# Patient Record
(demographics unavailable — no encounter records)

---

## 2024-10-30 NOTE — CONSULT LETTER
[Dear  ___] : Dear  [unfilled], [Courtesy Letter:] : I had the pleasure of seeing your patient, [unfilled], in my office today. [Sincerely,] : Sincerely, [FreeTextEntry2] : Ambika Basilio MD [address] [place] [id number] [FreeTextEntry1] : Mrs. Mclain is a very pleasant 39-year-old female patient who was seen in our office today at the request of the emergency department.  The patient suffered a traumatic head injury approximately 2 days ago after she was pulled down by her dog and struck her head on the driveway.  The patient presented to the emergency department whereupon she obtained a imaging of the head and neck which were negative for acute injuries.  The patient is currently asymptomatic.    The patient endorses allergies to sulfa medications and endorses a history of tachycardia requiring heart ablation in 2012.  The patient denies any ongoing medication use.  On examination, the patient is alert, oriented, and compliant with the exam.  The patient demonstrates full strength in the upper and lower extremities bilaterally.  The patient's cranial nerve examination is grossly normal.  The patient ambulates well.  The patient demonstrates 2+ reflexes in the upper and lower extremities bilaterally.  The patient does not have a Jessica sign or ankle clonus.  The patient is accompanied with an MRI scan of the cervical spine dated October 22, 2024.  These images demonstrate slight loss of cervical lordosis on the supine scans.  Mild degenerative disks are noted.  No obvious nerve root compression or spinal cord compression is noted.  No intracranial hemorrhages were noted on CT scans performed on October 22, 2024.  Taken together, the patient has a clinical history and radiographic findings most consistent with traumatic head and neck pain which is since resolved.  The patient has been reassured that she does not require any sort of neurosurgical intervention or follow-up.  The patient has been educated about symptoms to be vigilant for which could represent a postconcussion syndrome and has been advised to follow-up with our office on an as-needed basis should these symptoms develop. [FreeTextEntry3] : Brett Lind MD, PhD, FRCPSC  Attending Neurosurgeon  [address], [address] [place] [address]  Office: [phone]  Fax: [phone]

## 2024-10-30 NOTE — CONSULT LETTER
[Dear  ___] : Dear  [unfilled], [Courtesy Letter:] : I had the pleasure of seeing your patient, [unfilled], in my office today. [Sincerely,] : Sincerely, [FreeTextEntry2] : Ambika Basilio MD [address] [place] [phone] [FreeTextEntry1] : Mrs. Mclain is a very pleasant 39-year-old female patient who was seen in our office today at the request of the emergency department.  The patient suffered a traumatic head injury approximately 2 days ago after she was pulled down by her dog and struck her head on the driveway.  The patient presented to the emergency department whereupon she obtained a imaging of the head and neck which were negative for acute injuries.  The patient is currently asymptomatic.    The patient endorses allergies to sulfa medications and endorses a history of tachycardia requiring heart ablation in 2012.  The patient denies any ongoing medication use.  On examination, the patient is alert, oriented, and compliant with the exam.  The patient demonstrates full strength in the upper and lower extremities bilaterally.  The patient's cranial nerve examination is grossly normal.  The patient ambulates well.  The patient demonstrates 2+ reflexes in the upper and lower extremities bilaterally.  The patient does not have a Jessica sign or ankle clonus.  The patient is accompanied with an MRI scan of the cervical spine dated October 22, 2024.  These images demonstrate slight loss of cervical lordosis on the supine scans.  Mild degenerative disks are noted.  No obvious nerve root compression or spinal cord compression is noted.  No intracranial hemorrhages were noted on CT scans performed on October 22, 2024.  Taken together, the patient has a clinical history and radiographic findings most consistent with traumatic head and neck pain which is since resolved.  The patient has been reassured that she does not require any sort of neurosurgical intervention or follow-up.  The patient has been educated about symptoms to be vigilant for which could represent a postconcussion syndrome and has been advised to follow-up with our office on an as-needed basis should these symptoms develop. [FreeTextEntry3] : Brett Lind MD, PhD, FRCPSC  Attending Neurosurgeon  [address], [address] [place] [address]  Office: [phone]  Fax: [phone]

## 2025-01-15 NOTE — REASON FOR VISIT
[Procedure: _________] : a [unfilled] procedure visit [FreeTextEntry1] : right great saphenous vein radiofrequency ablation

## 2025-01-15 NOTE — PROCEDURE
[FreeTextEntry1] : right great saphenous vein radiofrequency ablation [FreeTextEntry2] : venous insufficiency [FreeTextEntry3] : Indication: right  lower extremity varicose veins with leg pain and leg swelling.  Venous insufficiency/ reflux.   Procedure: radiofrequency ablation of the right great saphenous vein.   Ms. AVIVA BORREGO is a 39 year old F with a history of right lower extremity varicose veins previously seen in the office.  Ultrasound examination demonstrated venous insufficiency. A trial of compression stockings, exercise, elevation, and pain medication was attempted without relief and definitive treatment with radiofrequency ablation was offered.   The patient has come for radiofrequency ablation treatment of the right great saphenous vein.  I have discussed the risks of the procedure at length with the patient. The risks discussed were inclusive of but not limited to infection, irritation at the site of infiltration of local anesthesia, possible numbness lower extremity and rare risk of deep venous thrombosis and pulmonary emboli. The patient agrees to proceed with the procedure.   The patient was escorted into the procedure room and a time out called.  The entire limb was prepped and draped in sterile fashion. The RF fiber was placed on the sterile field and connected by a sterile cable. Actuation, temperature, and impedance testing were performed to ensure that all components were connected and operating properly. The patient was placed on the procedure table and local anesthesia was instilled in the skin overlying the access site. Under ultrasound guidance, the vein was punctured with a micro puncture needle, using the anterior wall technique. A guide wire was now introduced through the needle, and the needle was then exchanged over the guide wire for a 6F sheath. The guide wire was removed, and the RF probe was then placed into the right great saphenous vein through the sheath and position confirmed using ultrasound guidance. After the RF probe position was verified by ultrasound, tumescent anesthesia consisting of normal saline, 1% lidocaine with 8.4% sodium bicarbonate was infiltrated, under ultrasound guidance, precisely into the perivenous compartment along the entire length of the vein until a halo of fluid was noted around the vein. After RF probe position was again confirmed with ultrasound imaging, RF energy was applied. The probe was gradually and carefully withdrawn at a rate of 7.5cm/20seconds.   5 cycles of RF performed using the 8 cm probe Total treatment time was 1:40 seconds. The total volume injected was 300cc Treatment length was 24cm and The probe is >3 cm from the SFJ.   Estimated Blood Loss: minimal   Repeat ultrasound of the treated vein was performed confirming successful treatment. The catheter and sheath were withdrawn, and hemostasis established with direct pressure. After assuring hemostasis, a sterile 4x4 was placed on the access site and an ACE compression wrap was applied. Patient tolerated procedure well. Patient was given post-procedure instructions and follow up appointment was scheduled.

## 2025-01-15 NOTE — PLAN
[TextEntry] : Ace wrap for 48 hours Resume regular activities of daily living.  Walking is recommended and encouraged. Keep hydrated Elevate legs when resting Avoid vigorous activity for 2 weeks. Follow up ultrasound and appointment within 3-7 days

## 2025-01-15 NOTE — ASSESSMENT
[TextEntry] : Ms. AVIVA BORREGO is a 39 year old here for right great saphenous vein radiofrequency ablation

## 2025-01-15 NOTE — END OF VISIT
[FreeTextEntry3] :   I, Dr. Scott was physically present for and performed the above surgical procedure with INA Alcantara present as an assistant.

## 2025-01-23 NOTE — PHYSICAL EXAM
[TextEntry] : RLE warm and well perfused + distal pulses minimal ecchymosis  no edema or discoloration

## 2025-01-29 NOTE — PROCEDURE
[FreeTextEntry1] : R SAP UGS [FreeTextEntry2] : CVI [FreeTextEntry3] : Preoperative Diagnosis: Varicose veins, symptomatic, chronic venous insufficiency (I87.2).        Postoperative Diagnosis: Same      Attending:  Alysia Scott MD     Assistant: _       				      Procedure:      1.	right leg, Ultrasound guided sclerotherapy, multiple veins (80432)      2.	right Leg Ambulatory Phlebectomy x 22 (56043)            Anesthesia: x Local/Tumescent (50cc 1% Lidocaine in 500cc NS) _ MAC      Complications: none            Description: Chart was reviewed, including ultrasound report and operative plan. Consent was obtained from the patient, risks and benefits of the procedure were explained. Prior to the start of the procedure, the patient was examined in the standing position. The skin was marked to identify superficial varicosities.  The patient was then placed on the procedure table and the entire lower extremity was prepped and a sterile field using barriers was created.  Time out was performed.            1. The deeper varicosities in the right leg were treated with ultrasound-guided sclerotherapy. Ultrasound was used to identify the extent and distribution of the deeper varicosities. Multiple varicosities were accessed with a 25G needle and injected with 1% STS. Compression of the veins using ultrasound was performed.  Patient was kept in Trendelenburg position for 10 minutes and instructed to perform dorsi/plantar flexion of the foot.              2. The area of the marked surface varicosities was anesthetized using tumescent anesthesia, 100 cc of 0.1% lidocaine solution. The marked varicose veins were then extracted using a micro-incisional avulsion technique and 22 separate stab incisions were made with a 16G Nokor Needle along the course of the varicosities.  The diseased vein segments were removed using phlebectomy hooks and mosquitoes.  Hemostasis was obtained with compression and wash-out of the subcutaneous space.            The incisions and injection sites were covered with 4x4 gauze and abdominal pads and the extremity was wrapped with kerlix and Coban using standard technique.  This was followed by application of a 20-30 mm Hg graduated venous compression hose.              The patient was given oral and written instructions for aftercare, which included frequent ambulation and leg elevation.  The patient was instructed to take NSAIDs.  The patient was scheduled for a follow-up visit in approximately 5-7 days with a venous duplex ultrasound of the treated leg to evaluate for success of procedure and evaluate for any deep vein thrombosis. The patient tolerated the procedure well and left the office in excellent condition ambulating without difficulty.

## 2025-02-05 NOTE — REASON FOR VISIT
[de-identified] : s/p right sap ugs [de-identified] : Patient presents for follow up after right sap ugs one week ago.  Doing well. Denies pain, fever, chills, sob or edema. Compliant with compression hose and ambulation.    ROS- negative   PE- extremity warm, well perfused without new edema.  No cellulitis, hematoma. Mild/moderate thrombophlebitis.  No residual varicosities.   VDX-  No evidence of DVT.       Assessment/Plan:  Doing well s/p right lower extremity intervention.   -warm compresses/massage -continue compression  -NSAIDS PRN  -RTC for LLE intervention

## 2025-02-25 NOTE — PROCEDURE
[FreeTextEntry1] : Left GSV RFA  [FreeTextEntry2] : Chronic Venous Insufficiency, Varicose veins with pain  [FreeTextEntry3] : Preoperative Diagnosis: Varicose veins, symptomatic, chronic venous insufficiency (I87.2).          Postoperative Diagnosis: Same        Attending: Alysia Scott MD       Assistant: INA Mosher       				       Procedure:      1.	Endovenous Radiofrequency Ablation of the Left GSV (23218)              Anesthesia:  Local/Tumescent (50cc 1% Lidocaine in 500cc NS)         Complications: none        Cycles: 6        Length of segment treated:_24 cm              Description: Chart was reviewed, including ultrasound report and operative plan. Consent was obtained from the patient, risks and benefits of the procedure were explained. Prior to the start of the procedure, the patient was examined in the standing position. The skin was marked to identify superficial varicosities.  The patient was then placed on the procedure table and the entire lower extremity was prepped and a sterile field using barriers was created.  Time out was performed.              1. Using ultrasound guidance a 19-guage needle was placed directly into the saphenous vein. The guide wire was then placed and the needle removed.  A 7F sheath for the RFA catheter was then introduced along the guide wire.  The RFA catheter was then introduced through the sheath.  The catheter tip position was then confirmed at approximately 2 cm from the junction.  The perivenous tissue of the saphenous vein was then anesthetized using Tumescent anesthesia, 200 cc of 0.1% lidocaine solution. The catheter placement was again confirmed using ultrasound 2cm from the saphenous junction. Segmental ablation of the saphenous was performed with radiofrequency energy using 120 degrees Celsius for 20 sec each segment (each segment was treated twice). The catheter was removed and local pressure applied.  Ultrasound was again carried out, which demonstrated significant venospasm of the saphenous vein and no evidence of trauma to the deep veins.                     The incisions and injection sites were covered with 4x4 gauze and abdominal pads and the extremity was wrapped with kerlix and Coban using standard technique.  This was followed by application of a 20-30 mm Hg graduated venous compression hose.                The patient was given oral and written instructions for aftercare, which included frequent ambulation and leg elevation.  The patient was instructed to take NSAIDs.  The patient was scheduled for a follow-up visit in approximately 5-7 days with a venous duplex ultrasound of the treated leg to evaluate for success of procedure and evaluate for any deep vein thrombosis. The patient tolerated the procedure well and left the office in excellent condition ambulating without difficulty.

## 2025-03-05 NOTE — PROCEDURE
[FreeTextEntry1] : Right SAP UGS  [FreeTextEntry2] : Chronic Venous Insufficiency, Varicose veins with pain [FreeTextEntry3] : Preoperative Diagnosis: Varicose veins, symptomatic, chronic venous insufficiency (I87.2).        Postoperative Diagnosis: Same        Attending:  Alysia Scott MD      Assistant: INA Mosher       				       Procedure:      1.	Left leg, Ultrasound guided sclerotherapy, multiple veins (69907)       2.	Left Leg Ambulatory Phlebectomy x 21 ([id number])              Anesthesia:  Local/Tumescent (50cc 1% Lidocaine in 500cc NS)   Complications: none              Description: Chart was reviewed, including ultrasound report and operative plan. Consent was obtained from the patient, risks and benefits of the procedure were explained. Prior to the start of the procedure, the patient was examined in the standing position. The skin was marked to identify superficial varicosities.  The patient was then placed on the procedure table and the entire lower extremity was prepped and a sterile field using barriers was created.    Time out was performed.              1. The deeper varicosities in the left leg were treated with ultrasound-guided sclerotherapy. Ultrasound was used to identify the extent and distribution of the deeper varicosities. Multiple varicosities were accessed with a 25G needle and injected with 1% STS. Compression of the veins using ultrasound was performed.  Patient was kept in Trendelenburg position for 10 minutes and instructed to perform dorsi/plantar flexion of the foot.                2. The area of the marked surface varicosities was anesthetized using tumescent anesthesia, _ cc of 0.1% lidocaine solution. The marked varicose veins were then extracted using a micro-incisional avulsion technique and 21 separate stab incisions were made with a 16G Nokor Needle along the course of the varicosities.  The diseased vein segments were removed using phlebectomy hooks and mosquitoes.  Hemostasis was obtained with compression and wash-out of the subcutaneous space.            The incisions and injection sites were covered with 4x4 gauze and abdominal pads and the extremity was wrapped with kerlix and Coban using standard technique.  This was followed by application of a 20-30 mm Hg graduated venous compression hose.                The patient was given oral and written instructions for aftercare, which included frequent ambulation and leg elevation.  The patient was instructed to take NSAIDs.  The patient was scheduled for a follow-up visit in approximately 5-7 days with a venous duplex ultrasound of the treated leg to evaluate for success of procedure and evaluate for any deep vein thrombosis. The patient tolerated the procedure well and left the office in excellent condition ambulating without difficulty.